# Patient Record
Sex: FEMALE | Race: AMERICAN INDIAN OR ALASKA NATIVE
[De-identification: names, ages, dates, MRNs, and addresses within clinical notes are randomized per-mention and may not be internally consistent; named-entity substitution may affect disease eponyms.]

---

## 2018-02-02 ENCOUNTER — HOSPITAL ENCOUNTER (OUTPATIENT)
Dept: HOSPITAL 31 - C.ENDO | Age: 58
Discharge: HOME | End: 2018-02-02
Attending: COLON & RECTAL SURGERY
Payer: MEDICARE

## 2018-02-02 VITALS — HEART RATE: 92 BPM | RESPIRATION RATE: 21 BRPM | SYSTOLIC BLOOD PRESSURE: 142 MMHG | DIASTOLIC BLOOD PRESSURE: 58 MMHG

## 2018-02-02 VITALS — OXYGEN SATURATION: 100 %

## 2018-02-02 VITALS — BODY MASS INDEX: 32.9 KG/M2

## 2018-02-02 VITALS — TEMPERATURE: 99.1 F

## 2018-02-02 DIAGNOSIS — K64.8: ICD-10-CM

## 2018-02-02 DIAGNOSIS — I10: ICD-10-CM

## 2018-02-02 DIAGNOSIS — K62.1: ICD-10-CM

## 2018-02-02 DIAGNOSIS — Z90.49: ICD-10-CM

## 2018-02-02 DIAGNOSIS — Q43.8: ICD-10-CM

## 2018-02-02 DIAGNOSIS — Z12.11: Primary | ICD-10-CM

## 2018-02-02 PROCEDURE — 88305 TISSUE EXAM BY PATHOLOGIST: CPT

## 2018-02-02 PROCEDURE — 45380 COLONOSCOPY AND BIOPSY: CPT

## 2018-02-02 PROCEDURE — 82948 REAGENT STRIP/BLOOD GLUCOSE: CPT

## 2018-02-02 NOTE — CP.SDSHP
Same Day Surgery H & P





- History


Proposed Procedure: colonoscopy





- Previous Medical/Surgical History


Cardiac: Hypertension


Previous Surgical History: G bypass cholecystectomy intestinal obstruction





- Allergies


Allergies: 


Allergies





buchu Allergy (Uncoded 02/02/18 08:31)


 RASH


juniper oil Allergy (Uncoded 02/02/18 08:31)


 RASH


parsley leaf Allergy (Uncoded 02/02/18 08:33)


 RASH


steroids Allergy (Uncoded 02/02/18 08:33)


 RASH


uva ursi Allergy (Uncoded 02/02/18 08:33)


 RASH











- Physical Exam


Vital Signs: 


 Vital Signs











  02/02/18





  08:10


 


Temperature 98.9 F


 


Pulse Rate 80


 


Respiratory 20





Rate 


 


Blood Pressure 142/65


 


O2 Sat by Pulse 100





Oximetry 














- Date & Time


Date: 02/02/18


Time: 10:18





Short Stay Discharge





- Short Stay Discharge


Admitting Diagnosis/Reason for Visit: SCREENING


Disposition: HOME/ ROUTINE